# Patient Record
Sex: MALE | URBAN - METROPOLITAN AREA
[De-identification: names, ages, dates, MRNs, and addresses within clinical notes are randomized per-mention and may not be internally consistent; named-entity substitution may affect disease eponyms.]

---

## 2022-09-14 ENCOUNTER — OFFICE VISIT (OUTPATIENT)
Dept: URBAN - METROPOLITAN AREA CLINIC 25 | Facility: CLINIC | Age: 52
End: 2022-09-14

## 2023-03-29 ENCOUNTER — OFFICE VISIT (OUTPATIENT)
Dept: URBAN - METROPOLITAN AREA CLINIC 109 | Facility: CLINIC | Age: 53
End: 2023-03-29

## 2023-08-16 ENCOUNTER — DASHBOARD ENCOUNTERS (OUTPATIENT)
Age: 53
End: 2023-08-16

## 2023-08-16 ENCOUNTER — LAB OUTSIDE AN ENCOUNTER (OUTPATIENT)
Dept: URBAN - METROPOLITAN AREA CLINIC 109 | Facility: CLINIC | Age: 53
End: 2023-08-16

## 2023-08-16 ENCOUNTER — OFFICE VISIT (OUTPATIENT)
Dept: URBAN - METROPOLITAN AREA CLINIC 109 | Facility: CLINIC | Age: 53
End: 2023-08-16
Payer: MEDICARE

## 2023-08-16 ENCOUNTER — OFFICE VISIT (OUTPATIENT)
Dept: URBAN - METROPOLITAN AREA CLINIC 109 | Facility: CLINIC | Age: 53
End: 2023-08-16

## 2023-08-16 VITALS
WEIGHT: 141.6 LBS | TEMPERATURE: 96.8 F | HEART RATE: 89 BPM | BODY MASS INDEX: 19.82 KG/M2 | HEIGHT: 71 IN | DIASTOLIC BLOOD PRESSURE: 106 MMHG | SYSTOLIC BLOOD PRESSURE: 190 MMHG

## 2023-08-16 DIAGNOSIS — I10 HTN, GOAL TO BE DETERMINED: ICD-10-CM

## 2023-08-16 DIAGNOSIS — R10.13 DYSPEPSIA: ICD-10-CM

## 2023-08-16 DIAGNOSIS — Z12.11 SCREENING FOR COLORECTAL CANCER: ICD-10-CM

## 2023-08-16 DIAGNOSIS — R63.4 WEIGHT LOSS: ICD-10-CM

## 2023-08-16 PROBLEM — 38341003: Status: ACTIVE | Noted: 2023-08-16

## 2023-08-16 PROCEDURE — 99204 OFFICE O/P NEW MOD 45 MIN: CPT | Performed by: INTERNAL MEDICINE

## 2023-08-16 RX ORDER — BISACODYL 5 MG
3 TABLET, DELAYED RELEASE (ENTERIC COATED) ORAL
Qty: 3 | Refills: 0 | OUTPATIENT
Start: 2023-08-16 | End: 2023-08-17

## 2023-08-16 RX ORDER — POLYETHYLENE GLYCOL 3350, SODIUM SULFATE ANHYDROUS, SODIUM BICARBONATE, SODIUM CHLORIDE, POTASSIUM CHLORIDE 236; 22.74; 6.74; 5.86; 2.97 G/4L; G/4L; G/4L; G/4L; G/4L
4000 ML POWDER, FOR SOLUTION ORAL ONCE
Qty: 4000 | Refills: 0 | OUTPATIENT
Start: 2023-08-16 | End: 2023-08-17

## 2023-08-16 NOTE — HPI-TODAY'S VISIT:
The patient presents for evaluation of dyspepsia/weight loss and colonoscopy.  Pt reports having epigastric discomfort with po intake for the past 1+ year.  sx's wax and wane and associated with ~25 lb weight loss in the past year.  denies lower gi complaints/symptoms including lower abd pain, gi bleeding or change in bowel habits.  appetite fluctuates.  denies alcohol abuse (social intake).  denies prior endoscopic work-up.  remote h/o cva.  did not bring home meds with him today.  denies h/o cad/chf and not on plavix/AC.

## 2023-08-22 ENCOUNTER — OFFICE VISIT (OUTPATIENT)
Dept: URBAN - METROPOLITAN AREA SURGERY CENTER 23 | Facility: SURGERY CENTER | Age: 53
End: 2023-08-22